# Patient Record
Sex: FEMALE | Race: WHITE
[De-identification: names, ages, dates, MRNs, and addresses within clinical notes are randomized per-mention and may not be internally consistent; named-entity substitution may affect disease eponyms.]

---

## 2023-01-11 ENCOUNTER — HOSPITAL ENCOUNTER (OUTPATIENT)
Dept: HOSPITAL 47 - RADCTMAIN | Age: 28
Discharge: HOME | End: 2023-01-11
Attending: INTERNAL MEDICINE
Payer: COMMERCIAL

## 2023-01-11 DIAGNOSIS — R19.7: ICD-10-CM

## 2023-01-11 DIAGNOSIS — R10.33: Primary | ICD-10-CM

## 2023-01-11 PROCEDURE — 74177 CT ABD & PELVIS W/CONTRAST: CPT

## 2023-01-11 NOTE — CT
EXAMINATION TYPE: CT abdomen pelvis w con

 

DATE OF EXAM: 1/11/2023

 

COMPARISON: None

 

INDICATION: chronic umbilical bleeding

 

DLP: 3462 mGycm, Automated exposure control for dose reduction was used.

 

CONTRAST:  70 mL of Isovue 300. 

                        Study performed with Oral Contrast

 

TECHNIQUE: Axial images were obtained from above the diaphragm to the pubic rami in the axial plane a
t 5 mm thick sections.  Reconstructed images are reviewed on the computer in the coronal plane.  

 

FINDINGS:

 

Limited CT sections are obtained the lung bases.  The lung bases are clear.

 

CT ABDOMEN:

 

Liver: Normal

 

Spleen: Normal

 

Pancreas: Normal

 

Adrenal glands: The adrenal glands are normal.

 

Gallbladder: Absent  

 

Kidneys: No masses are evident. No hydronephrosis is present.   No cysts are present.  Delayed images
 were obtained through the kidneys, which remain unremarkable.

 

Aorta: Normal

 

Inferior vena cava: Normal.

 

CT PELVIS: No suspicious abnormality level of the umbilicus is evident.

Loops of bowel within the abdomen and pelvis are normal.     There are loops of bowel which are incom
pletely distended or lack oral contrast limiting their evaluation.

 

Appendix: Not identified. No dilated tubular structure or inflammatory changes are evident.

 

Urinary bladder: Normal. 

 

Genitourinary structures: Uterus is normal. Adnexa are unremarkable.

 

Osseous structures: No suspicious lytic or sclerotic lesions.

 

IMPRESSIONS:

1.  No acute CT abdomen or pelvic abnormality. Follow-up can be performed as clinically indicated

## 2023-04-19 ENCOUNTER — HOSPITAL ENCOUNTER (EMERGENCY)
Dept: HOSPITAL 47 - EC | Age: 28
Discharge: HOME | End: 2023-04-19
Payer: COMMERCIAL

## 2023-04-19 VITALS
SYSTOLIC BLOOD PRESSURE: 136 MMHG | RESPIRATION RATE: 20 BRPM | HEART RATE: 75 BPM | DIASTOLIC BLOOD PRESSURE: 97 MMHG | TEMPERATURE: 98.8 F

## 2023-04-19 DIAGNOSIS — F12.90: ICD-10-CM

## 2023-04-19 DIAGNOSIS — M76.61: Primary | ICD-10-CM

## 2023-04-19 DIAGNOSIS — F32.A: ICD-10-CM

## 2023-04-19 DIAGNOSIS — Z79.899: ICD-10-CM

## 2023-04-19 DIAGNOSIS — J45.909: ICD-10-CM

## 2023-04-19 DIAGNOSIS — Z88.2: ICD-10-CM

## 2023-04-19 DIAGNOSIS — Z79.51: ICD-10-CM

## 2023-04-19 DIAGNOSIS — F41.9: ICD-10-CM

## 2023-04-19 PROCEDURE — 99283 EMERGENCY DEPT VISIT LOW MDM: CPT

## 2023-04-19 NOTE — ED
Lower Extremity Injury HPI





- General


Chief Complaint: Extremity Injury, Lower


Stated Complaint: Rt foot pain


Time Seen by Provider: 04/19/23 10:52


Source: patient, RN notes reviewed


Mode of arrival: ambulatory


Limitations: no limitations





- History of Present Illness


Initial Comments: 





25-year-old female presents emergency Department with chief complaint of right 

heel pain.  Patient states has been bothersome for last couple days.  She states

that she's been doing a large amount of getting up and down helping her dog that

surgery.  Patient states she has pain 1 and pain no pain at rest denies any 

trauma otherwise she states it feels a pulling sensation or any area of swelling

in her heel.  Patient denies any redness.  Patient offers no other associated 

complaints.





- Related Data


                                Home Medications











 Medication  Instructions  Recorded  Confirmed


 


Albuterol Sulfate [Ventolin HFA] 2 puff INHALATION RT-Q6H PRN 04/19/23 04/19/23


 


Beclomethasone Dip 80 Mcg/Puff 2 puff INHALATION RT-DAILY 04/19/23 04/19/23





[Qvar 80 mcg]   


 


Cholestyramine (with Sugar) 4 gm PO DAILY 04/19/23 04/19/23





[Questran]   


 


DULoxetine HCL [Cymbalta] 60 mg PO HS 04/19/23 04/19/23


 


Etonogestrel/Ethinyl Estradiol 1 ring VAGINAL AS DIRECTED 04/19/23 04/19/23





[Nuvaring Vaginal Ring]   


 


Fluticasone Nasal Spray [Flonase 2 spr EA NOSTRIL DAILY 04/19/23 04/19/23





Nasal Spray]   


 


Imipramine [Tofranil] 25 mg PO HS 04/19/23 04/19/23


 


Methylphenidate HCl [Ritalin] 10 mg PO DAILY 04/19/23 04/19/23


 


Omeprazole 20 mg PO BID 04/19/23 04/19/23


 


diazePAM [Valium] 2 mg PO HS 04/19/23 04/19/23








                                  Previous Rx's











 Medication  Instructions  Recorded


 


predniSONE 50 mg PO DAILY #5 tab 04/19/23











                                    Allergies











Allergy/AdvReac Type Severity Reaction Status Date / Time


 


sulfamethoxazole Allergy Severe Rash/Hives Verified 04/19/23 11:32





[From Bactrim]     


 


trimethoprim [From Bactrim] Allergy Severe Rash/Hives Verified 04/19/23 11:32














Review of Systems


ROS Statement: 


Those systems with pertinent positive or pertinent negative responses have been 

documented in the HPI.





ROS Other: All systems not noted in ROS Statement are negative.





Past Medical History


Past Medical History: Asthma


History of Any Multi-Drug Resistant Organisms: None Reported


Past Surgical History: Cholecystectomy, Tonsillectomy


Past Psychological History: ADD/ADHD, Anxiety, Depression


Smoking Status: Never smoker


Past Alcohol Use History: Occasional


Past Drug Use History: Marijuana





General Exam


Limitations: no limitations


General appearance: alert, in no apparent distress


Head exam: Present: atraumatic, normocephalic, normal inspection


Eye exam: Present: normal appearance, PERRL, EOMI.  Absent: scleral icterus, 

conjunctival injection, periorbital swelling


Neck exam: Present: normal inspection, full ROM.  Absent: tenderness, 

meningismus, lymphadenopathy


Respiratory exam: Present: normal lung sounds bilaterally.  Absent: respiratory 

distress, wheezes, rales, rhonchi, stridor


Cardiovascular Exam: Present: regular rate, normal rhythm, normal heart sounds. 

Absent: systolic murmur, diastolic murmur, rubs, gallop, clicks


Extremities exam: Present: other (Right heel there is tenderness in the 

posterior aspect and at the base of the foot.  Neurovascular intact full range 

of motion)





Course


                                   Vital Signs











  04/19/23





  10:40


 


Temperature 98.8 F


 


Pulse Rate 75


 


Respiratory 20





Rate 


 


Blood Pressure 136/97


 


O2 Sat by Pulse 99





Oximetry 














Medical Decision Making





- Medical Decision Making


Was pt. sent in by a medical professional or institution (, PA, NP, urgent 

care, hospital, or nursing home...) When possible be specific


@  -No


Did you speak to anyone other than the patient for history (EMS, parent, family,

police, friend...)? What history was obtained from this source 


@  -No


Did you review nursing and triage notes (agree or disagree)?  Why? 


@  -I reviewed and agree with nursing and triage notes


Were old charts reviewed (outside hosp., previous admission, EMS record, old 

EKG, old radiological studies, urgent care reports/EKG's, nursing home records)?

Report findings 


@  -No old charts were reviewed


Differential Diagnosis (chest pain, altered mental status, abdominal pain women,

abdominal pain men, vaginal bleeding, weakness, fever, dyspnea, syncope, 

headache, dizziness, GI bleed, back pain, seizure, CVA, palpatations, mental 

health, musculoskeletal)? 


@  -Right foot sprain, tendinitis, right foot fracture


EKG interpreted by me (3pts min.).


@  -None


X-rays interpreted by me (1pt min.).


@  -X-ray right heel shows small cocaine use per otherwise no acute process


CT interpreted by me (1pt min.).


@  -None done


U/S interpreted by me (1pt. min.).


@  -None done


What testing was considered but not performed or refused? (CT, X-rays, U/S, 

labs)? Why?


@  -None


What meds were considered but not given or refused? Why?


@  -None


Did you discuss the management of the patient with other professionals (

professionals i.e. , PA, NP, lab, RT, psych nurse, , , 

teacher, , )? Give summary


@  -No


Was smoking cessation discussed for >3mins.?


@  -No


Was critical care preformed (if so, how long)?


@  -No


Were there social determinants of health that impacted care today? How? 

(Homelessness, low income, unemployed, alcoholism, drug addiction, 

transportation, low edu. Level, literacy, decrease access to med. care, prison, 

rehab)?


@  -No


Was there de-escalation of care discussed even if they declined (Discuss DNR or 

withdrawal of care, Hospice)? DNR status


@  -No


What co-morbidities impacted this encounter? (DM, HTN, Smoking, COPD, CAD, 

Cancer, CVA, ARF, Chemo, Hep., AIDS, mental health diagnosis, sleep apnea, 

morbid obesity)?


@  -None


Was patient admitted / discharged? Hospital course, mention meds given and 

route, prescriptions, significant lab abnormalities, going to OR and other 

pertinent info.


@  -Discharge patient symptoms are consistent with acute tendinitis patient will

be placed on rest, ice elevation and follow-up with orthopedics was placed on 

anti-inflammatories and gentle daily stretching. 


Undiagnosed new problem with uncertain prognosis?


@  -No


Drug Therapy requiring intensive monitoring for toxicity (Heparin, Nitro, 

Insulin, Cardizem)?


@  -No


Were any procedures done?


@  -No


Diagnosis/symptom?


@  -Right Achilles tendinitis


Acute, or Chronic, or Acute on Chronic?


@  -Acute


Uncomplicated (without systemic symptoms) or Complicated (systemic symptoms)?


@  -Uncomplicated


Side effects of treatment?


@  -No


Exacerbation, Progression, or Severe Exacerbation?


@  -No


Poses a threat to life or bodily function? How? (Chest pain, USA, MI, pneumonia,

PE, COPD, DKA, ARF, appy, cholecystitis, CVA, Diverticulitis, Homicidal, 

Suicidal, threat to staff... and all critical care pts)


@  -No








Disposition


Clinical Impression: 


 Right Achilles tendinitis





Disposition: HOME SELF-CARE


Condition: Stable


Instructions (If sedation given, give patient instructions):  Achilles 

Tendinitis (ED)


Additional Instructions: 


Please return to the Emergency Department if symptoms worsen or any other 

concerns.


Prescriptions: 


predniSONE 50 mg PO DAILY #5 tab


Is patient prescribed a controlled substance at d/c from ED?: No


Referrals: 


Evelyn Corrales PAC [Primary Care Provider] - 1-2 days


Angel Guzmán MD [STAFF PHYSICIAN] - 1-2 days


Time of Disposition: 12:21

## 2023-04-19 NOTE — XR
EXAMINATION TYPE: XR calcaneus 2V RT

 

DATE OF EXAM: 4/19/2023

 

COMPARISON: None

 

HISTORY: Heel pain

 

TECHNIQUE: Two-view calcaneus

 

FINDINGS: No acute fractures are evident. Bohler's angle is normal. Joint spaces appear preserved. He
el pad is normal. Extremely subtle Achilles tendon calcaneal heel spur may be present. No plantar zulma
caneal heel spur is evident. Follow-up exams can be performed in 7-10 days from acute trauma for cont
inued pain.

 

IMPRESSION:

1.  No acute osseous abnormality right calcaneus